# Patient Record
Sex: FEMALE | Race: WHITE | NOT HISPANIC OR LATINO
[De-identification: names, ages, dates, MRNs, and addresses within clinical notes are randomized per-mention and may not be internally consistent; named-entity substitution may affect disease eponyms.]

---

## 2022-07-07 ENCOUNTER — APPOINTMENT (OUTPATIENT)
Dept: ORTHOPEDIC SURGERY | Facility: CLINIC | Age: 22
End: 2022-07-07

## 2022-07-07 VITALS — BODY MASS INDEX: 23.32 KG/M2 | HEIGHT: 65 IN | WEIGHT: 140 LBS

## 2022-07-07 DIAGNOSIS — Z00.00 ENCOUNTER FOR GENERAL ADULT MEDICAL EXAMINATION W/OUT ABNORMAL FINDINGS: ICD-10-CM

## 2022-07-07 DIAGNOSIS — J45.909 UNSPECIFIED ASTHMA, UNCOMPLICATED: ICD-10-CM

## 2022-07-07 DIAGNOSIS — S93.402A SPRAIN OF UNSPECIFIED LIGAMENT OF LEFT ANKLE, INITIAL ENCOUNTER: ICD-10-CM

## 2022-07-07 PROCEDURE — 99214 OFFICE O/P EST MOD 30 MIN: CPT

## 2022-07-07 PROCEDURE — 73610 X-RAY EXAM OF ANKLE: CPT | Mod: LT

## 2022-07-07 NOTE — PHYSICAL EXAM
[4___] : eversion 4[unfilled]/5 [5___] : [facility] 5[unfilled]/5 [2+] : posterior tibialis pulse: 2+ [Normal] : saphenous nerve sensation normal [Left] : left ankle [There are no fractures, subluxations or dislocations. No significant abnormalities are seen] : There are no fractures, subluxations or dislocations. No significant abnormalities are seen [] : non-antalgic [de-identified] : plantar flexion 30 degrees [de-identified] : inversion 15 degrees [de-identified] : eversion 10 degrees [[id number]] : dorsiflexion 15 degrees

## 2022-07-07 NOTE — HISTORY OF PRESENT ILLNESS
[Sudden] : sudden [8] : 8 [4] : 4 [Dull/Aching] : dull/aching [Localized] : localized [Tightness] : tightness [Constant] : constant [Household chores] : household chores [Leisure] : leisure [Social interactions] : social interactions [Rest] : rest [Sitting] : sitting [Standing] : standing [Walking] : walking [Stairs] : stairs [de-identified] : Pt is a 21 year old F who presents today for evaluation of their left ankle. Pt states that she fell off a curb last night 7/6/22 and twisted her ankle. Pain localized along the lateral ankle. Had XR taken which showed no evidence of fx and ace wrapped. She has a history of multiple sprains in the past.. No N/T. Ice to affected area. No other formal treatment to date. She is currently NWB in a stirrup brace and postop shoe with crutches.  [] : Post Surgical Visit: no [FreeTextEntry1] : L ankle

## 2022-07-07 NOTE — ASSESSMENT
[FreeTextEntry1] : The patient was explained that they have an ankle sprain. Weight bearing in a supportive brace was recommended.  Icing for 20 minutes 2-3 times per day was instructed. Physical therapy was prescribed, and home range of motion exercises were encouraged.\par

## 2022-07-07 NOTE — RETURN TO WORK/SCHOOL
[FreeTextEntry1] : The patient is unable to work at this time due to an injury. She can return to work on 7/18/22. \par

## 2022-11-26 ENCOUNTER — NON-APPOINTMENT (OUTPATIENT)
Age: 22
End: 2022-11-26

## 2022-12-05 ENCOUNTER — NON-APPOINTMENT (OUTPATIENT)
Age: 22
End: 2022-12-05

## 2023-02-08 ENCOUNTER — NON-APPOINTMENT (OUTPATIENT)
Age: 23
End: 2023-02-08

## 2023-03-06 ENCOUNTER — NON-APPOINTMENT (OUTPATIENT)
Age: 23
End: 2023-03-06

## 2023-03-20 ENCOUNTER — NON-APPOINTMENT (OUTPATIENT)
Age: 23
End: 2023-03-20

## 2023-04-12 ENCOUNTER — NON-APPOINTMENT (OUTPATIENT)
Age: 23
End: 2023-04-12

## 2023-04-22 ENCOUNTER — NON-APPOINTMENT (OUTPATIENT)
Age: 23
End: 2023-04-22

## 2023-07-20 ENCOUNTER — APPOINTMENT (OUTPATIENT)
Dept: ORTHOPEDIC SURGERY | Facility: CLINIC | Age: 23
End: 2023-07-20
Payer: COMMERCIAL

## 2023-07-20 VITALS — BODY MASS INDEX: 23.32 KG/M2 | WEIGHT: 140 LBS | HEIGHT: 65 IN

## 2023-07-20 DIAGNOSIS — S60.222A CONTUSION OF LEFT HAND, INITIAL ENCOUNTER: ICD-10-CM

## 2023-07-20 PROCEDURE — 73130 X-RAY EXAM OF HAND: CPT | Mod: LT

## 2023-07-20 PROCEDURE — 99213 OFFICE O/P EST LOW 20 MIN: CPT

## 2023-07-20 RX ORDER — NORETHINDRONE ACETATE AND ETHINYL ESTRADIOL, ETHINYL ESTRADIOL AND FERROUS FUMARATE 1MG-10(24)
KIT ORAL
Refills: 0 | Status: ACTIVE | COMMUNITY

## 2023-07-20 NOTE — HISTORY OF PRESENT ILLNESS
[7] : 7 [5] : 5 [Dull/Aching] : dull/aching [Sharp] : sharp [Constant] : constant [Ice] : ice [Part time] : Work status: part time [de-identified] : slammed left hand in a door- +pain, persists- 1w- \par RHD, day care worker [] : Post Surgical Visit: no [FreeTextEntry1] : left hand [FreeTextEntry5] :  Patient is here today for left hand slammed in car door last week. Went to [facility] and got xrays.  [FreeTextEntry7] : from hand down to elbow [de-identified] : lifting [de-identified] : none

## 2023-07-20 NOTE — IMAGING
[de-identified] : left hand- skin itact, no swelling\par FAROM\par NVID\par TTP about the 5th MC\par \par xrys- 3 views of the hand- no fractures

## 2023-07-20 NOTE — ASSESSMENT
[FreeTextEntry1] : bone contusion- may take up to 6 weeks to heal\par \par The patient was advised of the diagnosis. The natural history of the pathology was explained in full to the patient in layman's terms. All questions were answered. The risks and benefits of surgical and non-surgical treatment alternatives were explained in full to the patient.\par \par NSAIDs recommended.  Patient warned of risk of NSAID medication to stomach and GI tract, risk of increase blood pressure, cardiac risk, and risk of fluid retention.  The patient should clear taking medication with internist/PMD if any problem with heart, blood pressure, or GI system exists.\par \par The patient was advised to apply ice (wrapped in a towel or protective covering) to the area daily (20 minutes at a time, 2-4X/day \par \par

## 2023-08-13 ENCOUNTER — NON-APPOINTMENT (OUTPATIENT)
Age: 23
End: 2023-08-13

## 2023-10-25 ENCOUNTER — NON-APPOINTMENT (OUTPATIENT)
Age: 23
End: 2023-10-25

## 2023-11-29 ENCOUNTER — APPOINTMENT (OUTPATIENT)
Dept: ORTHOPEDIC SURGERY | Facility: CLINIC | Age: 23
End: 2023-11-29
Payer: COMMERCIAL

## 2023-11-29 VITALS — BODY MASS INDEX: 22.49 KG/M2 | WEIGHT: 135 LBS | HEIGHT: 65 IN

## 2023-11-29 DIAGNOSIS — S33.5XXA SPRAIN OF LIGAMENTS OF LUMBAR SPINE, INITIAL ENCOUNTER: ICD-10-CM

## 2023-11-29 DIAGNOSIS — S23.9XXA SPRAIN OF UNSPECIFIED PARTS OF THORAX, INITIAL ENCOUNTER: ICD-10-CM

## 2023-11-29 DIAGNOSIS — S13.9XXA SPRAIN OF JOINTS AND LIGAMENTS OF UNSPECIFIED PARTS OF NECK, INITIAL ENCOUNTER: ICD-10-CM

## 2023-11-29 PROCEDURE — 72110 X-RAY EXAM L-2 SPINE 4/>VWS: CPT

## 2023-11-29 PROCEDURE — 99204 OFFICE O/P NEW MOD 45 MIN: CPT

## 2023-11-29 PROCEDURE — 72170 X-RAY EXAM OF PELVIS: CPT

## 2023-11-29 PROCEDURE — 72070 X-RAY EXAM THORAC SPINE 2VWS: CPT

## 2023-11-29 PROCEDURE — 72040 X-RAY EXAM NECK SPINE 2-3 VW: CPT

## 2023-11-29 RX ORDER — METHYLPREDNISOLONE 4 MG/1
4 TABLET ORAL
Qty: 1 | Refills: 0 | Status: ACTIVE | COMMUNITY
Start: 2023-11-29 | End: 1900-01-01

## 2024-01-10 ENCOUNTER — NON-APPOINTMENT (OUTPATIENT)
Age: 24
End: 2024-01-10

## 2024-05-08 ENCOUNTER — NON-APPOINTMENT (OUTPATIENT)
Age: 24
End: 2024-05-08

## 2024-07-03 ENCOUNTER — APPOINTMENT (OUTPATIENT)
Dept: ORTHOPEDIC SURGERY | Facility: CLINIC | Age: 24
End: 2024-07-03
Payer: COMMERCIAL

## 2024-07-03 VITALS — WEIGHT: 142 LBS | HEIGHT: 64 IN | BODY MASS INDEX: 24.24 KG/M2

## 2024-07-03 DIAGNOSIS — M22.2X2 PATELLOFEMORAL DISORDERS, RIGHT KNEE: ICD-10-CM

## 2024-07-03 DIAGNOSIS — M25.562 PAIN IN RIGHT KNEE: ICD-10-CM

## 2024-07-03 DIAGNOSIS — M25.561 PAIN IN RIGHT KNEE: ICD-10-CM

## 2024-07-03 DIAGNOSIS — M22.2X1 PATELLOFEMORAL DISORDERS, RIGHT KNEE: ICD-10-CM

## 2024-07-03 DIAGNOSIS — G89.29 PAIN IN RIGHT KNEE: ICD-10-CM

## 2024-07-03 PROCEDURE — 73564 X-RAY EXAM KNEE 4 OR MORE: CPT | Mod: 50

## 2024-07-03 PROCEDURE — 99203 OFFICE O/P NEW LOW 30 MIN: CPT

## 2024-07-29 ENCOUNTER — APPOINTMENT (OUTPATIENT)
Dept: ORTHOPEDIC SURGERY | Facility: CLINIC | Age: 24
End: 2024-07-29
Payer: COMMERCIAL

## 2024-07-29 DIAGNOSIS — M76.899 OTHER SPECIFIED ENTHESOPATHIES OF UNSPECIFIED LOWER LIMB, EXCLUDING FOOT: ICD-10-CM

## 2024-07-29 DIAGNOSIS — S76.011A STRAIN OF MUSCLE, FASCIA AND TENDON OF RIGHT HIP, INITIAL ENCOUNTER: ICD-10-CM

## 2024-07-29 PROCEDURE — 99213 OFFICE O/P EST LOW 20 MIN: CPT

## 2024-07-29 NOTE — DISCUSSION/SUMMARY
[de-identified] : The patient and their family member(s) were advised of the diagnosis. The natural history of the pathology was explained in full to the patient and the family in layman's terms.  while rehab'ing knees developed some hip pain (not a new issue but re-exacerbation) appears to be mostly hip flexor and glute med  Here is the plan that we have set forth today. 1. new PT with differnet diagnoses listed 2. keep pushing through hip strengthening. make sure if an exercise hurts the pT verifies that you are performing it well and not just over-recruiting hip flexor. 3. follow up in 4 weeks - and if still hurting will get imaging. If well will discharge PT and continue a HEP 4. ice or heat can be used to ease pain; motrin as needed The patient and the family understands the plan of care as described above.  All questions have been answered. Thank you for allowing me to care for THOMAS. Sincerely, Dacia Fischer, DO, FAAP, CAQ[facility]

## 2024-07-29 NOTE — IMAGING
[de-identified] : HIP EXAM Gait:  Reciprocal gait with no evidence of antalgia  No overt Trendelenburg gait  Standing evaluation: Shoulder levels symmetric: yes Iliac crest heights symmetric: yes Sagittal Contour is NORMAL: yes  SPine Palpation: Tenderness: none Hip and Pelvis:  Palpation:  Tenderness:mild at TFL, into anterior hip flexor tendons. No tenderness to iliac crest, ASIS. Mild at glute med into greater trochanter posteriorly.  Masses: none appreciated  ROM: Full, symmetric, painless bilateral pelvic and hip ROM (mild discomfort with end range IR and ER. little pinch with terminal hip flexion Muscle Strength: 5/5 strength of all muscle of the bilateral hip and pelvis except 4+ with hip flexion against resistance and 5- with hip abduction  Pain with Resistance Testing:  Sartorius:none Rectus Femoris: none Iliopsoas: present Hamstring extension: none Hamstring flexion: none Abduction: mildly present Adduction: none  Special Tests: Straight Leg Raise: negative Impingement: mildly positive TAYLOR: negative   Thigh:           Inspection: No muscle atrophy           Soft Tissues: normal           Palpation: Tenderness: none aside from what is mentioned above                        Masses: absent

## 2024-07-29 NOTE — IMAGING
[de-identified] : HIP EXAM Gait:  Reciprocal gait with no evidence of antalgia  No overt Trendelenburg gait  Standing evaluation: Shoulder levels symmetric: yes Iliac crest heights symmetric: yes Sagittal Contour is NORMAL: yes  SPine Palpation: Tenderness: none Hip and Pelvis:  Palpation:  Tenderness:mild at TFL, into anterior hip flexor tendons. No tenderness to iliac crest, ASIS. Mild at glute med into greater trochanter posteriorly.  Masses: none appreciated  ROM: Full, symmetric, painless bilateral pelvic and hip ROM (mild discomfort with end range IR and ER. little pinch with terminal hip flexion Muscle Strength: 5/5 strength of all muscle of the bilateral hip and pelvis except 4+ with hip flexion against resistance and 5- with hip abduction  Pain with Resistance Testing:  Sartorius:none Rectus Femoris: none Iliopsoas: present Hamstring extension: none Hamstring flexion: none Abduction: mildly present Adduction: none  Special Tests: Straight Leg Raise: negative Impingement: mildly positive TAYLOR: negative   Thigh:           Inspection: No muscle atrophy           Soft Tissues: normal           Palpation: Tenderness: none aside from what is mentioned above                        Masses: absent

## 2024-07-29 NOTE — HISTORY OF PRESENT ILLNESS
[de-identified] : 07/29/2024: Thomas Du is here for a follow up on bilateral knees, where right > left knee pain. She states she is no longer feeling any pain in her knees but is now experiencing pain in right hip since 7/13/24. When walking/ pressure is put on hip, it's a sharp stabbing pain. When sitting, pain is dull/ achy. Pain with activity is 7, resting is 2. Has seen her chiropractor a few years ago where they told her that sometimes the hip just "gets stuck". She's currently attending PT for chasity knees and right hip at [facility] ([place]) where they ice hip when bothering her.   07/03/2024: THOMAS DU is a 23 year old female here today in regard to right > left knee pain. She is a new patient to me today  Date of Injury/Onset: Several years ago, consistent for the past week on the right. She is not sure that she has ever had imaging. the pain always comes and goes so she just usually deal with it. Pain:    At Rest: 3 With Activity: 6   Mechanism of injury: never had a known injury.  now really bad with kneeling down  Quality of symptoms: Aching, throbbing  Improves with: Rest, ice Worse with: Walking, bending  No instability, lockin or catching. Prior treatment/ Imaging: N/A   Out of work: N/A School/Sport/Position/Occupation:  employee Additional Information: None   Review of Systems: Constitutional: negative HEENT: negative CV: negative Pulm: negative GI: negative : negative Neuro: negative Skin: negative Endocrine: negative Heme: negative MSK: See HPI.

## 2024-07-29 NOTE — HISTORY OF PRESENT ILLNESS
[de-identified] : 07/29/2024: Thomas Du is here for a follow up on bilateral knees, where right > left knee pain. She states she is no longer feeling any pain in her knees but is now experiencing pain in right hip since 7/13/24. When walking/ pressure is put on hip, it's a sharp stabbing pain. When sitting, pain is dull/ achy. Pain with activity is 7, resting is 2. Has seen her chiropractor a few years ago where they told her that sometimes the hip just "gets stuck". She's currently attending PT for chasity knees and right hip at [facility] ([place]) where they ice hip when bothering her.   07/03/2024: THOMAS DU is a 23 year old female here today in regard to right > left knee pain. She is a new patient to me today  Date of Injury/Onset: Several years ago, consistent for the past week on the right. She is not sure that she has ever had imaging. the pain always comes and goes so she just usually deal with it. Pain:    At Rest: 3 With Activity: 6   Mechanism of injury: never had a known injury.  now really bad with kneeling down  Quality of symptoms: Aching, throbbing  Improves with: Rest, ice Worse with: Walking, bending  No instability, lockin or catching. Prior treatment/ Imaging: N/A   Out of work: N/A School/Sport/Position/Occupation:  employee Additional Information: None   Review of Systems: Constitutional: negative HEENT: negative CV: negative Pulm: negative GI: negative : negative Neuro: negative Skin: negative Endocrine: negative Heme: negative MSK: See HPI.

## 2024-07-29 NOTE — DISCUSSION/SUMMARY
[de-identified] : The patient and their family member(s) were advised of the diagnosis. The natural history of the pathology was explained in full to the patient and the family in layman's terms.  while rehab'ing knees developed some hip pain (not a new issue but re-exacerbation) appears to be mostly hip flexor and glute med  Here is the plan that we have set forth today. 1. new PT with differnet diagnoses listed 2. keep pushing through hip strengthening. make sure if an exercise hurts the pT verifies that you are performing it well and not just over-recruiting hip flexor. 3. follow up in 4 weeks - and if still hurting will get imaging. If well will discharge PT and continue a HEP 4. ice or heat can be used to ease pain; motrin as needed The patient and the family understands the plan of care as described above.  All questions have been answered. Thank you for allowing me to care for THOMAS. Sincerely, Dacia Fischer, DO, FAAP, CAQ[facility]

## 2024-08-19 ENCOUNTER — APPOINTMENT (OUTPATIENT)
Dept: ORTHOPEDIC SURGERY | Facility: CLINIC | Age: 24
End: 2024-08-19

## 2024-08-22 NOTE — DISCUSSION/SUMMARY
[de-identified] : The patient and their family member(s) were advised of the diagnosis. The natural history of the pathology was explained in full to the patient and the family in layman's terms.  while rehab'ing knees developed some hip pain (not a new issue but re-exacerbation) appears to be mostly hip flexor and glute med  Here is the plan that we have set forth today. 1. new PT with differnet diagnoses listed 2. keep pushing through hip strengthening. make sure if an exercise hurts the pT verifies that you are performing it well and not just over-recruiting hip flexor. 3. follow up in 4 weeks - and if still hurting will get imaging. If well will discharge PT and continue a HEP 4. ice or heat can be used to ease pain; motrin as needed The patient and the family understands the plan of care as described above.  All questions have been answered. Thank you for allowing me to care for THOMAS. Sincerely, Dacia Fischer, DO, FAAP, CAQ[facility]

## 2024-08-22 NOTE — IMAGING
[de-identified] : HIP EXAM Gait:  Reciprocal gait with no evidence of antalgia  No overt Trendelenburg gait  Standing evaluation: Shoulder levels symmetric: yes Iliac crest heights symmetric: yes Sagittal Contour is NORMAL: yes  SPine Palpation: Tenderness: none Hip and Pelvis:  Palpation:  Tenderness:mild at TFL, into anterior hip flexor tendons. No tenderness to iliac crest, ASIS. Mild at glute med into greater trochanter posteriorly.  Masses: none appreciated  ROM: Full, symmetric, painless bilateral pelvic and hip ROM (mild discomfort with end range IR and ER. little pinch with terminal hip flexion Muscle Strength: 5/5 strength of all muscle of the bilateral hip and pelvis except 4+ with hip flexion against resistance and 5- with hip abduction  Pain with Resistance Testing:  Sartorius:none Rectus Femoris: none Iliopsoas: present Hamstring extension: none Hamstring flexion: none Abduction: mildly present Adduction: none  Special Tests: Straight Leg Raise: negative Impingement: mildly positive TAYLOR: negative   Thigh:           Inspection: No muscle atrophy           Soft Tissues: normal           Palpation: Tenderness: none aside from what is mentioned above                        Masses: absent

## 2024-08-22 NOTE — HISTORY OF PRESENT ILLNESS
[de-identified] : 08/19/2024: Thomas returns today for right hip and bilateral knees, where R>L knee.  07/29/2024: Thomas Du is here for a follow up on bilateral knees, where right > left knee pain. She states she is no longer feeling any pain in her knees but is now experiencing pain in right hip since 7/13/24. When walking/ pressure is put on hip, it's a sharp stabbing pain. When sitting, pain is dull/ achy. Pain with activity is 7, resting is 2. Has seen her chiropractor a few years ago where they told her that sometimes the hip just "gets stuck". She's currently attending PT for chasity knees and right hip at [facility] ([place]) where they ice hip when bothering her.   07/03/2024: THOMAS DU is a 23 year old female here today in regard to right > left knee pain. She is a new patient to me today  Date of Injury/Onset: Several years ago, consistent for the past week on the right. She is not sure that she has ever had imaging. the pain always comes and goes so she just usually deal with it. Pain:    At Rest: 3 With Activity: 6   Mechanism of injury: never had a known injury.  now really bad with kneeling down  Quality of symptoms: Aching, throbbing  Improves with: Rest, ice Worse with: Walking, bending  No instability, lockin or catching. Prior treatment/ Imaging: N/A   Out of work: N/A School/Sport/Position/Occupation:  employee Additional Information: None   Review of Systems: Constitutional: negative HEENT: negative CV: negative Pulm: negative GI: negative : negative Neuro: negative Skin: negative Endocrine: negative Heme: negative MSK: See HPI.

## 2024-09-12 ENCOUNTER — NON-APPOINTMENT (OUTPATIENT)
Age: 24
End: 2024-09-12

## 2024-10-02 ENCOUNTER — NON-APPOINTMENT (OUTPATIENT)
Age: 24
End: 2024-10-02

## 2025-01-02 ENCOUNTER — NON-APPOINTMENT (OUTPATIENT)
Age: 25
End: 2025-01-02

## 2025-02-13 ENCOUNTER — NON-APPOINTMENT (OUTPATIENT)
Age: 25
End: 2025-02-13

## 2025-02-25 ENCOUNTER — NON-APPOINTMENT (OUTPATIENT)
Age: 25
End: 2025-02-25

## 2025-05-19 ENCOUNTER — NON-APPOINTMENT (OUTPATIENT)
Age: 25
End: 2025-05-19

## 2025-06-05 ENCOUNTER — NON-APPOINTMENT (OUTPATIENT)
Age: 25
End: 2025-06-05